# Patient Record
Sex: MALE | Race: OTHER | HISPANIC OR LATINO | Employment: UNEMPLOYED | URBAN - METROPOLITAN AREA
[De-identification: names, ages, dates, MRNs, and addresses within clinical notes are randomized per-mention and may not be internally consistent; named-entity substitution may affect disease eponyms.]

---

## 2023-05-11 ENCOUNTER — HOSPITAL ENCOUNTER (EMERGENCY)
Facility: HOSPITAL | Age: 43
Discharge: HOME | End: 2023-05-11
Attending: EMERGENCY MEDICINE

## 2023-05-11 VITALS
BODY MASS INDEX: 24.8 KG/M2 | SYSTOLIC BLOOD PRESSURE: 140 MMHG | HEIGHT: 63 IN | RESPIRATION RATE: 18 BRPM | DIASTOLIC BLOOD PRESSURE: 96 MMHG | HEART RATE: 86 BPM | WEIGHT: 140 LBS | TEMPERATURE: 98 F | OXYGEN SATURATION: 100 %

## 2023-05-11 DIAGNOSIS — K21.00 GASTROESOPHAGEAL REFLUX DISEASE WITH ESOPHAGITIS WITHOUT HEMORRHAGE: Primary | ICD-10-CM

## 2023-05-11 LAB — GROUP A STREP, MOLECULAR: NEGATIVE

## 2023-05-11 PROCEDURE — 99283 EMERGENCY DEPT VISIT LOW MDM: CPT

## 2023-05-11 PROCEDURE — 87651 STREP A DNA AMP PROBE: CPT | Performed by: EMERGENCY MEDICINE

## 2023-05-11 RX ORDER — PANTOPRAZOLE SODIUM 40 MG/1
40 TABLET, DELAYED RELEASE ORAL DAILY
Qty: 30 TABLET | Refills: 0 | Status: SHIPPED | OUTPATIENT
Start: 2023-05-11 | End: 2024-05-10

## 2023-05-12 NOTE — ED PROVIDER NOTES
"Encounter Date: 5/11/2023    SCRIBE #1 NOTE: I, Louie Valdez, am scribing for, and in the presence of,  Ronak Leger MD.     History     Chief Complaint   Patient presents with    Cough     Pt reports feeling a tightness in his throat starting today per pt it began as itching and has worsened today. Pt denies taking/drinking anything new     Oral Swelling     Time seen by provider: 8:25 PM on 05/11/2023    Vitor Velásquez is a 43 y.o. male who presents to the ED with an onset of, "itchiness," to this throat that began yesterday, as well as cough, runny nose, and postnasal drip. History is limited by a language barrier. He states he deals with acid reflux frequently and has been belching since yesterday. He denies frequent use of NSAIDs. The patient denies difficulty breathing or swallowing or any other symptoms at this time. He denies any other medical problems or allergies. He denies frequent alcohol use. There is no recorded PMHx or PSHx.    The history is provided by the patient. The history is limited by a language barrier. A  was used.   Review of patient's allergies indicates:   Allergen Reactions    Penicillins      No past medical history on file.  No past surgical history on file.  No family history on file.     Review of Systems   Constitutional:  Negative for fever.   HENT:  Positive for postnasal drip and rhinorrhea. Negative for sore throat and trouble swallowing.         Positive for throat itchiness.   Respiratory:  Positive for cough. Negative for shortness of breath, wheezing and stridor.    Cardiovascular:  Negative for chest pain.   Gastrointestinal:  Negative for nausea.   Genitourinary:  Negative for dysuria.   Musculoskeletal:  Negative for back pain.   Skin:  Negative for rash.   Neurological:  Negative for weakness.   Hematological:  Does not bruise/bleed easily.     Physical Exam     Initial Vitals [05/11/23 2016]   BP Pulse Resp Temp SpO2   (!) 140/96 86 18 " 97.8 °F (36.6 °C) 100 %      MAP       --         Physical Exam    Nursing note and vitals reviewed.  Constitutional: He appears well-developed and well-nourished. No distress.   HENT:   Head: Normocephalic and atraumatic.   Mouth/Throat: Uvula is midline and mucous membranes are normal. Posterior oropharyngeal erythema present.   No hoarseness, no stridor. Tolerating secretions.   Eyes: Conjunctivae and EOM are normal. Pupils are equal, round, and reactive to light.   Neck: Neck supple.   Cardiovascular:  Normal rate, regular rhythm and normal heart sounds.     Exam reveals no gallop and no friction rub.       No murmur heard.  Pulmonary/Chest: Breath sounds normal. No respiratory distress. He has no wheezes. He has no rhonchi. He has no rales.   Abdominal: Abdomen is soft. Bowel sounds are normal. He exhibits no distension. There is no abdominal tenderness.   Musculoskeletal:         General: No tenderness or edema. Normal range of motion.      Cervical back: Neck supple.     Neurological: He is alert and oriented to person, place, and time.   Skin: Skin is warm and dry.   Psychiatric: He has a normal mood and affect.       ED Course   Procedures  Labs Reviewed   GROUP A STREP, MOLECULAR          Imaging Results    None          Medications - No data to display  Medical Decision Making:   History:   Old Medical Records: I decided to obtain old medical records.  Clinical Tests:   Lab Tests: Ordered and Reviewed        Scribe Attestation:   Scribe #1: I performed the above scribed service and the documentation accurately describes the services I performed. I attest to the accuracy of the note.      ED Course as of 05/12/23 2228   Thu May 11, 2023   2105 Strep test is negative.  Patient has no signs of peritonsillar abscess retropharyngeal abscess.  Patient states he does belching burping has occasional dyspepsia and symptoms worse later at night.  He has erythematous posterior oropharynx.  I suspect the patient is  having reflux with esophagitis.  I will put him on Protonix daily.  I feel that he is stable for discharge at this time.  I see no signs of allergic reaction. [JS]      ED Course User Index  [JS] Ronak Leger MD          I, Dr. Ronak Leger personally performed the services described in this documentation. All medical record entries made by the scribe were at my direction and in my presence.  I have reviewed the chart and agree that the record reflects my personal performance and is accurate and complete. Ronak Leger MD.  10:28 PM 05/12/2023    DISCLAIMER: This note was prepared with Dragon NaturallySpeaking voice recognition transcription software. Garbled syntax, mangled pronouns, and other bizarre constructions may be attributed to that software system        Clinical Impression:   Final diagnoses:  [K21.00] Gastroesophageal reflux disease with esophagitis without hemorrhage (Primary)        ED Disposition Condition    Discharge Stable          ED Prescriptions       Medication Sig Dispense Start Date End Date Auth. Provider    pantoprazole (PROTONIX) 40 MG tablet Take 1 tablet (40 mg total) by mouth once daily. 30 tablet 5/11/2023 5/10/2024 Ronak Leger MD          Follow-up Information       Follow up With Specialties Details Why Contact [facility]  Schedule an appointment as soon as possible for a visit   [address] [place] [id number]  [phone]               Ronak Leger MD  05/12/23 2642

## 2023-05-12 NOTE — ED NOTES
Pt discharged after using [facility] service (Karlene #[id number]), all questions and concerns addressed at this time. Pt verbalized understanding of prescriptions and discharge paperwork.